# Patient Record
Sex: FEMALE | Race: BLACK OR AFRICAN AMERICAN | NOT HISPANIC OR LATINO | Employment: FULL TIME | URBAN - METROPOLITAN AREA
[De-identification: names, ages, dates, MRNs, and addresses within clinical notes are randomized per-mention and may not be internally consistent; named-entity substitution may affect disease eponyms.]

---

## 2023-03-21 LAB
CLUE CELLS: NORMAL
NUGENT SCORE: 1
YEAST: NORMAL

## 2023-05-12 LAB
ALANINE AMINOTRANSFERASE (SGPT) (U/L) IN SER/PLAS: 13 U/L (ref 7–45)
ALBUMIN (G/DL) IN SER/PLAS: 3.5 G/DL (ref 3.4–5)
ALKALINE PHOSPHATASE (U/L) IN SER/PLAS: 73 U/L (ref 33–110)
ANION GAP IN SER/PLAS: 10 MMOL/L (ref 10–20)
ASPARTATE AMINOTRANSFERASE (SGOT) (U/L) IN SER/PLAS: 12 U/L (ref 9–39)
BILIRUBIN TOTAL (MG/DL) IN SER/PLAS: 0.4 MG/DL (ref 0–1.2)
CALCIUM (MG/DL) IN SER/PLAS: 9.2 MG/DL (ref 8.6–10.6)
CARBON DIOXIDE, TOTAL (MMOL/L) IN SER/PLAS: 26 MMOL/L (ref 21–32)
CHLORIDE (MMOL/L) IN SER/PLAS: 106 MMOL/L (ref 98–107)
CREATININE (MG/DL) IN SER/PLAS: 0.68 MG/DL (ref 0.5–1.05)
ERYTHROCYTE DISTRIBUTION WIDTH (RATIO) BY AUTOMATED COUNT: 13.2 % (ref 11.5–14.5)
ERYTHROCYTE MEAN CORPUSCULAR HEMOGLOBIN CONCENTRATION (G/DL) BY AUTOMATED: 31.7 G/DL (ref 32–36)
ERYTHROCYTE MEAN CORPUSCULAR VOLUME (FL) BY AUTOMATED COUNT: 97 FL (ref 80–100)
ERYTHROCYTES (10*6/UL) IN BLOOD BY AUTOMATED COUNT: 3.9 X10E12/L (ref 4–5.2)
GFR FEMALE: >90 ML/MIN/1.73M2
GLUCOSE (MG/DL) IN SER/PLAS: 71 MG/DL (ref 74–99)
GLUCOSE, 1 HR SCREEN, PREG: 75 MG/DL
HEMATOCRIT (%) IN BLOOD BY AUTOMATED COUNT: 37.9 % (ref 36–46)
HEMOGLOBIN (G/DL) IN BLOOD: 12 G/DL (ref 12–16)
LEUKOCYTES (10*3/UL) IN BLOOD BY AUTOMATED COUNT: 8.7 X10E9/L (ref 4.4–11.3)
NRBC (PER 100 WBCS) BY AUTOMATED COUNT: 0 /100 WBC (ref 0–0)
PLATELETS (10*3/UL) IN BLOOD AUTOMATED COUNT: 209 X10E9/L (ref 150–450)
POTASSIUM (MMOL/L) IN SER/PLAS: 4.1 MMOL/L (ref 3.5–5.3)
PROTEIN TOTAL: 6.8 G/DL (ref 6.4–8.2)
REFLEX ADDED, ANEMIA PANEL: ABNORMAL
SODIUM (MMOL/L) IN SER/PLAS: 138 MMOL/L (ref 136–145)
SYPHILIS TOTAL AB: NONREACTIVE
UREA NITROGEN (MG/DL) IN SER/PLAS: 10 MG/DL (ref 6–23)

## 2023-05-15 ENCOUNTER — APPOINTMENT (OUTPATIENT)
Dept: LAB | Facility: LAB | Age: 23
End: 2023-05-15

## 2023-07-10 LAB — GROUP B STREP SCREEN: NORMAL

## 2024-02-01 ENCOUNTER — LAB REQUISITION (OUTPATIENT)
Dept: LAB | Facility: HOSPITAL | Age: 24
End: 2024-02-01

## 2024-02-01 LAB — SARS-COV-2 RNA RESP QL NAA+PROBE: DETECTED

## 2024-02-01 PROCEDURE — 87635 SARS-COV-2 COVID-19 AMP PRB: CPT

## 2025-05-23 ENCOUNTER — OFFICE VISIT (OUTPATIENT)
Dept: OBSTETRICS AND GYNECOLOGY | Facility: CLINIC | Age: 25
End: 2025-05-23
Payer: COMMERCIAL

## 2025-05-23 VITALS
WEIGHT: 293 LBS | BODY MASS INDEX: 60.33 KG/M2 | DIASTOLIC BLOOD PRESSURE: 85 MMHG | HEART RATE: 62 BPM | SYSTOLIC BLOOD PRESSURE: 124 MMHG

## 2025-05-23 DIAGNOSIS — Z30.46 NEXPLANON REMOVAL: ICD-10-CM

## 2025-05-23 DIAGNOSIS — Z11.3 ROUTINE SCREENING FOR STI (SEXUALLY TRANSMITTED INFECTION): ICD-10-CM

## 2025-05-23 DIAGNOSIS — N89.8 VAGINAL DISCHARGE: ICD-10-CM

## 2025-05-23 DIAGNOSIS — Z01.419 WELL WOMAN EXAM: Primary | ICD-10-CM

## 2025-05-23 PROCEDURE — 99395 PREV VISIT EST AGE 18-39: CPT | Performed by: NURSE PRACTITIONER

## 2025-05-23 PROCEDURE — 99395 PREV VISIT EST AGE 18-39: CPT | Mod: 25 | Performed by: NURSE PRACTITIONER

## 2025-05-23 PROCEDURE — 11982 REMOVE DRUG IMPLANT DEVICE: CPT | Performed by: NURSE PRACTITIONER

## 2025-05-23 PROCEDURE — 1036F TOBACCO NON-USER: CPT | Performed by: NURSE PRACTITIONER

## 2025-05-23 RX ORDER — ACETAZOLAMIDE 500 MG/1
500 CAPSULE, EXTENDED RELEASE ORAL 2 TIMES DAILY
COMMUNITY

## 2025-05-23 ASSESSMENT — ENCOUNTER SYMPTOMS
MUSCULOSKELETAL NEGATIVE: 0
RESPIRATORY NEGATIVE: 0
HEMATOLOGIC/LYMPHATIC NEGATIVE: 0
CONSTITUTIONAL NEGATIVE: 0
NEUROLOGICAL NEGATIVE: 0
ENDOCRINE NEGATIVE: 0
PSYCHIATRIC NEGATIVE: 0
EYES NEGATIVE: 0
CARDIOVASCULAR NEGATIVE: 0
ALLERGIC/IMMUNOLOGIC NEGATIVE: 0
GASTROINTESTINAL NEGATIVE: 0

## 2025-05-23 ASSESSMENT — PATIENT HEALTH QUESTIONNAIRE - PHQ9
SUM OF ALL RESPONSES TO PHQ9 QUESTIONS 1 AND 2: 0
1. LITTLE INTEREST OR PLEASURE IN DOING THINGS: NOT AT ALL
2. FEELING DOWN, DEPRESSED OR HOPELESS: NOT AT ALL

## 2025-05-23 ASSESSMENT — PAIN SCALES - GENERAL: PAINLEVEL_OUTOF10: 0-NO PAIN

## 2025-05-23 NOTE — PROGRESS NOTES
Subjective   Butch Travis is a 25 y.o. female who is here for Annual Exam (Patient here for her annual exam and removal of Nexplanon/No pain/No falls /Patient doesn't want any birth control at the moment/STD blood and urine/LMP - Patient unsure , patient reports she had some bleeding between March and April /Patient reports having some vaginal irritation for 2-3 weeks. Patient has discharge with a smell to it . Patient reports she doesn't have any pain).     Concerns today:  Vaginal discharge with foul odor, irritation, present for a few weeks, denies itching  Desires nexplanon removal    Periods are irregular in setting of Nexplanon, sometimes regular and some times every few months, lasting 7 days.   Dysmenorrhea:none.   Cyclic symptoms include none.      Sexual Activity: sexually active, male partners; Patient reports 1 partners in the last 12 months.  Pain with intercourse? No   Loss of desire? No     History of prior STI: remote history  Desires STI screening? Yes    Current contraception: Nexplanon, desires removal today    Last pap: 3/2023 NILM  History of abnormal Pap smear: no  Family history of uterine or ovarian cancer: no    Last mammogram: N/A  Family history of breast cancer: no    OB History    Para Term  AB Living   5 2 2 0 3 2   SAB IAB Ectopic Multiple Live Births   2 1 0 0 2      Objective   /85   Pulse 62   Wt (!) 159 kg (351 lb 8 oz)   LMP  (LMP Unknown)    Physical Exam  Constitutional:       Appearance: Normal appearance. She is obese.   Genitourinary:      Vulva normal.      Right Labia: No rash, tenderness, lesions or skin changes.     Left Labia: No tenderness, lesions, skin changes or rash.     Vaginal discharge and erythema present.      No vaginal tenderness or bleeding.   Cardiovascular:      Rate and Rhythm: Normal rate and regular rhythm.   Pulmonary:      Effort: Pulmonary effort is normal.      Breath sounds: Normal breath sounds.   Abdominal:       Palpations: Abdomen is soft.      Tenderness: There is no abdominal tenderness.   Neurological:      General: No focal deficit present.      Mental Status: She is alert and oriented to person, place, and time. Mental status is at baseline.   Skin:     General: Skin is warm and dry.   Psychiatric:         Mood and Affect: Mood normal.         Behavior: Behavior normal.         Thought Content: Thought content normal.         Judgment: Judgment normal.        Assessment/Plan   Diagnoses and all orders for this visit:  Well woman exam  Vaginal discharge  -     Vaginitis Gram Stain For Bacterial Vaginosis + Yeast  Nexplanon removal  Routine screening for STI (sexually transmitted infection)  -     C. trachomatis / N. gonorrhoeae, Amplified, Urogenital  -     Hepatitis B Surface Antigen; Future  -     Hepatitis C Antibody; Future  -     HIV 1/2 Antigen/Antibody Screen with Reflex to Confirmation; Future  -     Syphilis Screen with Reflex; Future  -     Trichomonas vaginalis, Amplified    Follow up in about 1 year (around 5/23/2026) for annual exam.  Lucy Garcia, APRN-CNM, APRN-CNP

## 2025-05-23 NOTE — PROGRESS NOTES
Procedure:  Subdermal Contraceptive Implant Removal  Date of Insertion:  7/2023  Date of Removal:  05/23/25    Information related to removal of the implant:   Reason(s) for removal: Irregular bleeding  Was implant palpable before removal? Yes    Procedure:    Implant identified. Left upper arm prepped with Betadinex3. 1% lidocaine injected at planned incision site. A vertical incision 2 mm was performed with a scalpel at the distal end of implant. The implant was removed using a hemostat. The implant was inspected and found to be intact and complete.  Steri strips and a pressure dressing were applied to the site. After removal instructions were given and verbally reviewed with the patient who acknowledged her understanding.    Difficulties with the implant removal procedure?  no    Plans for contraception:  no method    Other follow-up needed:  none  Lucy Garcia, [place], APRMELINDA-CNP

## 2025-05-24 LAB
BV SCORE VAG QL: NORMAL
C TRACH RRNA SPEC QL NAA+PROBE: NOT DETECTED
HBV SURFACE AG SERPL QL IA: NORMAL
HCV AB SERPL QL IA: NORMAL
HIV 1+2 AB+HIV1 P24 AG SERPL QL IA: NORMAL
N GONORRHOEA RRNA SPEC QL NAA+PROBE: NOT DETECTED
QUEST GC CT AMPLIFIED (ALWAYS MESSAGE): NORMAL
T PALLIDUM AB SER QL IA: NORMAL
T VAGINALIS RRNA SPEC QL NAA+PROBE: NOT DETECTED

## 2025-05-28 LAB
HBV SURFACE AG SERPL QL IA: NORMAL
HCV AB SERPL QL IA: NORMAL
HIV 1+2 AB+HIV1 P24 AG SERPL QL IA: NORMAL
T PALLIDUM AB SER QL IA: NEGATIVE

## 2025-07-14 ENCOUNTER — TELEPHONE (OUTPATIENT)
Dept: PRIMARY CARE | Facility: CLINIC | Age: 25
End: 2025-07-14
Payer: COMMERCIAL

## 2025-07-14 NOTE — PROGRESS NOTES
Subjective   Butch Travis is a 25 y.o. female who presents for new patient visit to establish PCP care for annual physical exam.    HPI:     25 y.o. female who presents for new patient visit to establish PCP care for annual physical exam.         EMR/EPIC records reviewed    PMHx:  Idiopathic intracranial hypertension.   Morbid obesity     Healthcare Providers:  [facility]: Dana Velez MD   GYN: Lucy Garcia V, APRN-CNM, APRN-CNP         Preventive Health Services:  -Last physical: 7/16/2025  -last pap smear:  3/2023 NILM   -last mammogram: Never; patient denies family history of breast cancer.  Mammogram due at age 40 years old  -last colonoscopy/cologuard: Never; patient denies family history of colon polyps or colon cancer.  Colon cancer screening due at age 45 years old  -last STI screening: Negative HIV, syphilis, gonorrhea, chlamydia, and trichomonas 5/2025  -Hep C screening: negative 5/2025       Immunizations:  -Childhood vaccines: completed per patient  -updated COVID spike vaccine: NOW DUE  -TDAP: 2023        There is no immunization history on file for this patient.               Today Butch reports:     - doing and feeling well    -vaginal irritation and thinks that she has a yeast infection.  She reports that she washes inside vagina with a rag that may be causing irritation. She denies/chills, vaginal discharge or bleeding, dysuria, hematuria, or UTI sxs.       -taking all medications as prescribed with no reported adverse medication side effects    - Follows with [facility] neurology for idiopathic intracranial hypertension and has undergone lumbar punctures in the past for treatment    - LMP: 7/8/25    - Contraception: None; Nexplanon removed by GYN 5/23/2025    - Sexually active with 1 male partner.  She denies history of STIs          Today  she has no other reported complaints, issues, or problems.  ROS is NEG for HEADACHE, NAUSEA, VOMITING, DIARRHEA, CHEST PAIN, SOB, and BLEEDING.    "Review of systems (12) is negative for all systems except for any identified issues in HPI above.      Objective     /90   Pulse 78   Temp 36.2 °C (97.2 °F)   Ht 1.626 m (5' 4\")   Wt (!) 154 kg (340 lb 8 oz)   LMP 07/08/2025   SpO2 98%   BMI 58.45 kg/m²      COMPLETE PHYSICAL EXAM    GENERAL           General Appearance: pleasant, well-appearing, well-developed, well-hydrated, well-nourished, well-groomed, NAD .        HEENT           NECK supple, Neg for adneopathy no thyroid enlargement or nodules, Oropharynx normal no exudates. Ears: TMs intact, normal, no effusions.       EYES           Pupils: PERRLA, no photophobia.        HEART           Rate and Rhythm regular rate and rhythm. Heart sounds: normal S1S2. Murmurs: none.        LUNGS           Effort: Normal chest wall, no pectus, Normal air entry all fields, Clear to IPPA, RR<16 with no use of accessory muscles.        BREASTS           Bilaterally: no masses, no nipple retraction, no nipple discharge, no abnormal skin changes. Axilla: no lymphadenopathy.        BACK           General: unremarkable, no spinal tenderness or rashes.        ABDOMEN           General: soft, obese , nondistended nontender to palpation,  normal to inspection, no HSM, neg for LKKS or masses and BSs heard in all quadrants                 LYMPHATICS           Cervical: none. Axillary: none.        MUSCULOSKELETAL           gross abnormalities no gross abnormalities, no joint redness or swelling.        EXTREMITIES           Varicose veins: not present. Pulses: 2+ bilateral. Clubbing: none. Cyanosis: no.        NEUROLOGICAL           Orientation: alert and oriented x 3. Grossly normal: yes. Plantars: downgoing bilaterally. Muscle Bulk: normal . Cranial Nerves: CN's II-XII grossly intact.        PSYCHOLOGY           Affect: appropriate. Mood: pleasant.        SKIN: No rashes.  No lesions.    Assessment/Plan   Problem List Items Addressed This Visit    None  Visit Diagnoses  "        Annual physical exam    -  Primary      Morbid obesity (Multi)          Idiopathic intracranial hypertension          Lipid screening          Vitamin D deficiency          Routine screening for STI (sexually transmitted infection)          Cervical cancer screening          Immunization counseling          Immunity status testing          Tuberculosis screening              Annual Physical Exam: Completed today  -labs ordered (see A/P above)    Paperwork for school:  -completed, scanned to chart and given to patient    Immunity testing  - MMR, varicella and Hep B IgG ordered    TB screening:  -Tspot ordered    Elevated diastolic BP: mildly elevated diastolic BP  -low salt, carbohydrate, low cholesterol diet, regularly exercise, and limit alcohol intake    Idiopathic intracranial hypertension HTN: Asymptomatic.  No red flag signs or symptoms.  Followed by [facility] neurology  -cont  medications and management by neurology  -Emergency department and 911/EMS precautions discussed and reviewed with patient    Morbid obesity/class III  -Lipid panel and hemoglobin A1c ordered  -low carbohydrate, low cholesterol diet, regularly exercise, and limit alcohol intake  -Patient encouraged to lose weight  - Referral to nutrition ordered for assistance with weight loss  - Referral to endocrinology weight management ordered to discuss medication options for weight loss  - Patient declined referral to bariatric surgery discussed surgical weight loss management options    Vaginal irritation, and possible yeast vaginitis: most likely due to washing inside vagina with washcloth. Negative STI screening 5/2025. No signs/sxs of UTI  -UA ordered  -fluconazole 150 mg PO x 1  -genital hygiene discussed and reviewed wit patient       Lipid Screening  -lipid panel ordered     Diabetes Screening  -HgBA1c ordered     Vitamin D deficiency  -Vit D levels ordered        STI Screening: Negative HIV, syphilis, GC/CT/trich 5/2025    Breast cancer  screening: Due at age 40 years old      Cervical cancer screening: Up-to-date  - Continue annual well woman visits and Pap smears with GYN     Counseling:      Medication education:        Education:  The patient is counseled regarding potential side-effects of all new medications        Understanding:  Patient expressed understanding        Adherence:  No barriers to adherence identified        Immunizations Counseling  -recommend updated COVID spike vaccine  that can be obtained at local pharmacy     FOLLOW-UP: 4 weeks to discuss and review test results    I have personally reviewed all available pertinent labs, imaging, and consult notes with the patient.     Discussed recommended plan of care with patient. Patient expressed understanding and agreement with plan of care. All of patient's  questions were answered at the time. Patient had no additional questions at the time.         Cherelle Wallis MD, PhD

## 2025-07-14 NOTE — TELEPHONE ENCOUNTER
Patient scheduled for physical 7/16/2025.  Please ask patient if she needs paperwork completion for school since it was unclear.  Does she need immunity testing for measles mumps rubella or varicella as requirement for school (if she is in nursing school or health professions school).  I generally complete paperwork at a follow-up visit once her labs have resulted if she needs any paperwork completed.  please advise. Thank you

## 2025-07-16 ENCOUNTER — OFFICE VISIT (OUTPATIENT)
Dept: PRIMARY CARE | Facility: CLINIC | Age: 25
End: 2025-07-16
Payer: COMMERCIAL

## 2025-07-16 VITALS
HEART RATE: 78 BPM | TEMPERATURE: 97.2 F | SYSTOLIC BLOOD PRESSURE: 122 MMHG | OXYGEN SATURATION: 98 % | DIASTOLIC BLOOD PRESSURE: 90 MMHG | BODY MASS INDEX: 50.02 KG/M2 | HEIGHT: 64 IN | WEIGHT: 293 LBS

## 2025-07-16 DIAGNOSIS — Z11.1 TUBERCULOSIS SCREENING: ICD-10-CM

## 2025-07-16 DIAGNOSIS — E55.9 VITAMIN D DEFICIENCY: ICD-10-CM

## 2025-07-16 DIAGNOSIS — E66.01 MORBID OBESITY (MULTI): ICD-10-CM

## 2025-07-16 DIAGNOSIS — B37.31 YEAST VAGINITIS: ICD-10-CM

## 2025-07-16 DIAGNOSIS — Z13.220 LIPID SCREENING: ICD-10-CM

## 2025-07-16 DIAGNOSIS — G93.2 IDIOPATHIC INTRACRANIAL HYPERTENSION: ICD-10-CM

## 2025-07-16 DIAGNOSIS — Z01.84 IMMUNITY STATUS TESTING: ICD-10-CM

## 2025-07-16 DIAGNOSIS — Z71.85 IMMUNIZATION COUNSELING: ICD-10-CM

## 2025-07-16 DIAGNOSIS — Z23 ENCOUNTER FOR ADMINISTRATION OF VACCINE: ICD-10-CM

## 2025-07-16 DIAGNOSIS — Z11.3 ROUTINE SCREENING FOR STI (SEXUALLY TRANSMITTED INFECTION): ICD-10-CM

## 2025-07-16 DIAGNOSIS — Z00.00 ANNUAL PHYSICAL EXAM: Primary | ICD-10-CM

## 2025-07-16 DIAGNOSIS — Z12.4 CERVICAL CANCER SCREENING: ICD-10-CM

## 2025-07-16 PROCEDURE — 99385 PREV VISIT NEW AGE 18-39: CPT | Performed by: FAMILY MEDICINE

## 2025-07-16 PROCEDURE — 1036F TOBACCO NON-USER: CPT | Performed by: FAMILY MEDICINE

## 2025-07-16 PROCEDURE — 3008F BODY MASS INDEX DOCD: CPT | Performed by: FAMILY MEDICINE

## 2025-07-16 RX ORDER — FLUCONAZOLE 150 MG/1
150 TABLET ORAL ONCE
Qty: 1 TABLET | Refills: 0 | Status: SHIPPED | OUTPATIENT
Start: 2025-07-16 | End: 2025-07-16

## 2025-07-16 ASSESSMENT — COLUMBIA-SUICIDE SEVERITY RATING SCALE - C-SSRS
6. HAVE YOU EVER DONE ANYTHING, STARTED TO DO ANYTHING, OR PREPARED TO DO ANYTHING TO END YOUR LIFE?: NO
2. HAVE YOU ACTUALLY HAD ANY THOUGHTS OF KILLING YOURSELF?: NO
1. IN THE PAST MONTH, HAVE YOU WISHED YOU WERE DEAD OR WISHED YOU COULD GO TO SLEEP AND NOT WAKE UP?: NO

## 2025-07-16 ASSESSMENT — PAIN SCALES - GENERAL: PAINLEVEL_OUTOF10: 0-NO PAIN

## 2025-07-16 ASSESSMENT — PATIENT HEALTH QUESTIONNAIRE - PHQ9
SUM OF ALL RESPONSES TO PHQ9 QUESTIONS 1 AND 2: 0
2. FEELING DOWN, DEPRESSED OR HOPELESS: NOT AT ALL
1. LITTLE INTEREST OR PLEASURE IN DOING THINGS: NOT AT ALL

## 2025-07-18 ENCOUNTER — RESULTS FOLLOW-UP (OUTPATIENT)
Dept: PRIMARY CARE | Facility: CLINIC | Age: 25
End: 2025-07-18
Payer: COMMERCIAL

## 2025-07-18 DIAGNOSIS — E55.9 VITAMIN D DEFICIENCY: Primary | ICD-10-CM

## 2025-07-20 LAB
25(OH)D3+25(OH)D2 SERPL-MCNC: 14 NG/ML (ref 30–100)
ALBUMIN SERPL-MCNC: 4.4 G/DL (ref 3.6–5.1)
ALP SERPL-CCNC: 61 U/L (ref 31–125)
ALT SERPL-CCNC: 14 U/L (ref 6–29)
ANION GAP SERPL CALCULATED.4IONS-SCNC: 7 MMOL/L (CALC) (ref 7–17)
APPEARANCE UR: ABNORMAL
AST SERPL-CCNC: 14 U/L (ref 10–30)
BACTERIA #/AREA URNS HPF: ABNORMAL /HPF
BASOPHILS # BLD AUTO: 60 CELLS/UL (ref 0–200)
BASOPHILS NFR BLD AUTO: 0.9 %
BILIRUB SERPL-MCNC: 0.7 MG/DL (ref 0.2–1.2)
BILIRUB UR QL STRIP: NEGATIVE
BUN SERPL-MCNC: 11 MG/DL (ref 7–25)
CALCIUM SERPL-MCNC: 9.3 MG/DL (ref 8.6–10.2)
CHLORIDE SERPL-SCNC: 107 MMOL/L (ref 98–110)
CHOLEST SERPL-MCNC: 133 MG/DL
CHOLEST/HDLC SERPL: 2.6 (CALC)
CO2 SERPL-SCNC: 26 MMOL/L (ref 20–32)
COLOR UR: ABNORMAL
CREAT SERPL-MCNC: 0.91 MG/DL (ref 0.5–0.96)
EGFRCR SERPLBLD CKD-EPI 2021: 90 ML/MIN/1.73M2
EOSINOPHIL # BLD AUTO: 101 CELLS/UL (ref 15–500)
EOSINOPHIL NFR BLD AUTO: 1.5 %
ERYTHROCYTE [DISTWIDTH] IN BLOOD BY AUTOMATED COUNT: 13.3 % (ref 11–15)
EST. AVERAGE GLUCOSE BLD GHB EST-MCNC: 100 MG/DL
EST. AVERAGE GLUCOSE BLD GHB EST-SCNC: 5.5 MMOL/L
GLUCOSE SERPL-MCNC: 92 MG/DL (ref 65–99)
GLUCOSE UR QL STRIP: NEGATIVE
HBA1C MFR BLD: 5.1 %
HBV SURFACE AB SERPL IA-ACNC: <5 MIU/ML
HCT VFR BLD AUTO: 42.8 % (ref 35–45)
HDLC SERPL-MCNC: 51 MG/DL
HGB BLD-MCNC: 13.4 G/DL (ref 11.7–15.5)
HGB UR QL STRIP: NEGATIVE
HYALINE CASTS #/AREA URNS LPF: ABNORMAL /LPF
IGNF NEG CNTRL BLD: NORMAL
KETONES UR QL STRIP: ABNORMAL
LDLC SERPL CALC-MCNC: 68 MG/DL (CALC)
LEUKOCYTE ESTERASE UR QL STRIP: ABNORMAL
LYMPHOCYTES # BLD AUTO: 2754 CELLS/UL (ref 850–3900)
LYMPHOCYTES NFR BLD AUTO: 41.1 %
M TB IFN-G BLD-IMP: NEGATIVE
MCH RBC QN AUTO: 29.4 PG (ref 27–33)
MCHC RBC AUTO-ENTMCNC: 31.3 G/DL (ref 32–36)
MCV RBC AUTO: 93.9 FL (ref 80–100)
MEV IGG SER IA-ACNC: 229 AU/ML
MITOGEN IGNF.SPOT COUNT BLD: NORMAL
MONOCYTES # BLD AUTO: 670 CELLS/UL (ref 200–950)
MONOCYTES NFR BLD AUTO: 10 %
MUV IGG SER IA-ACNC: 153 AU/ML
NEUTROPHILS # BLD AUTO: 3116 CELLS/UL (ref 1500–7800)
NEUTROPHILS NFR BLD AUTO: 46.5 %
NITRITE UR QL STRIP: NEGATIVE
NONHDLC SERPL-MCNC: 82 MG/DL (CALC)
PH UR STRIP: 5.5 [PH] (ref 5–8)
PLATELET # BLD AUTO: 248 THOUSAND/UL (ref 140–400)
PMV BLD REES-ECKER: 11.3 FL (ref 7.5–12.5)
POTASSIUM SERPL-SCNC: 4 MMOL/L (ref 3.5–5.3)
PROT SERPL-MCNC: 8 G/DL (ref 6.1–8.1)
PROT UR QL STRIP: ABNORMAL
QUEST PANEL A SPOT COUNT: 0
QUEST PANEL B SPOT COUNT: 0
RBC # BLD AUTO: 4.56 MILLION/UL (ref 3.8–5.1)
RBC #/AREA URNS HPF: ABNORMAL /HPF
RUBV IGG SERPL IA-ACNC: 7.19 INDEX
SERVICE CMNT-IMP: ABNORMAL
SODIUM SERPL-SCNC: 140 MMOL/L (ref 135–146)
SP GR UR STRIP: 1.03 (ref 1–1.03)
SQUAMOUS #/AREA URNS HPF: ABNORMAL /HPF
TRIGL SERPL-MCNC: 62 MG/DL
TSH SERPL-ACNC: 3.1 MIU/L
VZV IGG SER IA-ACNC: 1.7 S/CO
WBC # BLD AUTO: 6.7 THOUSAND/UL (ref 3.8–10.8)
WBC #/AREA URNS HPF: ABNORMAL /HPF

## 2025-07-21 RX ORDER — ERGOCALCIFEROL 1.25 MG/1
1.25 CAPSULE ORAL WEEKLY
Qty: 4 CAPSULE | Refills: 2 | Status: SHIPPED | OUTPATIENT
Start: 2025-07-21 | End: 2025-10-13

## 2025-07-21 NOTE — PROGRESS NOTES
Subjective   Butch Travis is a 25 y.o. female who presents for follow-up visit to discuss and review test results and for hepatitis B #1/3 vaccine for lack of immunity to hepatitis B.    HPI:      25 y.o. female who presents for follow-up visit to discuss and review test results and for hepatitis B #1/3 vaccine for lack of immunity to hepatitis B.       EMR/EPIC records reviewed    Last seen by me on 7/16/2025 for new patient visit to establish PCP care for annual physical exam.  At visit:   Annual Physical Exam: Completed today  -labs ordered (see A/P above)     Paperwork for school:  -completed, scanned to chart and given to patient     Immunity testing  - MMR, varicella and Hep B IgG ordered     TB screening:  -Tspot ordered     Elevated diastolic BP: mildly elevated diastolic BP  -low salt, carbohydrate, low cholesterol diet, regularly exercise, and limit alcohol intake     Idiopathic intracranial hypertension HTN: Asymptomatic.  No red flag signs or symptoms.  Followed by [facility]  -cont  medications and management by neurology  -Emergency department and 911/EMS precautions discussed and reviewed with patient     Morbid obesity/class III  -Lipid panel and hemoglobin A1c ordered  -low carbohydrate, low cholesterol diet, regularly exercise, and limit alcohol intake  -Patient encouraged to lose weight  - Referral to nutrition ordered for assistance with weight loss  - Referral to endocrinology weight management ordered to discuss medication options for weight loss  - Patient declined referral to bariatric surgery discussed surgical weight loss management options     Vaginal irritation, and possible yeast vaginitis: most likely due to washing inside vagina with washcloth. Negative STI screening 5/2025. No signs/sxs of UTI  -UA ordered  -fluconazole 150 mg PO x 1  -genital hygiene discussed and reviewed wit patient         Lipid Screening  -lipid panel ordered     Diabetes Screening  -HgBA1c ordered      Vitamin D deficiency  -Vit D levels ordered        STI Screening: Negative HIV, syphilis, GC/CT/trich 5/2025     Breast cancer screening: Due at age 40 years old        Cervical cancer screening: Up-to-date  - Continue annual well woman visits and Pap smears with GYN         Immunizations Counseling  -recommend updated COVID spike vaccine  that can be obtained at local pharmacy     FOLLOW-UP: 4 weeks to discuss and review test results          PMHx:  Idiopathic intracranial hypertension.   Morbid obesity  Lack of immunity to hepatitis B     Healthcare Providers:  [facility]: Dana Velez MD   GYN: Lucy Garcia V, APRN-CNM, APRN-CNP         Preventive Health Services:  -Last physical: 7/16/2025  -last pap smear:  3/2023 NILM   -last mammogram: Never; patient denies family history of breast cancer.  Mammogram due at age 40 years old  -last colonoscopy/cologuard: Never; patient denies family history of colon polyps or colon cancer.  Colon cancer screening due at age 45 years old  -last STI screening: Negative HIV, syphilis, gonorrhea, chlamydia, and trichomonas 5/2025  -Hep C screening: negative 5/2025        Immunizations:  -Childhood vaccines: completed per patient  -updated COVID spike vaccine: NOW DUE  -TDAP: 2023    Immunization History   Administered Date(s) Administered    DTaP / Hib 2000, 2000    DTaP vaccine, pediatric  (INFANRIX) 2000, 2000, 2000, 11/19/2001, 07/15/2004    Flu vaccine (IIV4), preservative free *Check age/dose* 11/06/2023    HPV, Quadrivalent 06/06/2013    Hepatitis B vaccine, 19 yrs and under (RECOMBIVAX, ENGERIX) 2000, 2000, 2000    HiB PRP-OMP conjugate vaccine, pediatric (PEDVAXHIB) 2000, 2000, 2000, 11/19/2001    MMR vaccine, subcutaneous (MMR II) 05/24/2001, 07/15/2004, 07/26/2023    Meningococcal ACWY-D (Menactra) 4-valent conjugate vaccine 06/06/2013, 09/06/2016    Pneumococcal conjugate vaccine, 13-valent  (PREVNAR 13) 2000, 2000, 02/08/2001    Poliovirus vaccine, subcutaneous (IPOL) 2000, 2000, 2000, 07/15/2004    Tdap vaccine, age 7 year and older (BOOSTRIX, ADACEL) 06/06/2013, 05/12/2023    Varicella vaccine, subcutaneous (VARIVAX) 05/24/2001, 08/21/2008                 Interval history:    - Labs ordered 7/16/2025 completed.  Based on test results:    Vitamin D deficiency: Patient advised to please start vitamin D 50,000 units once weekly for 12 weeks; prescription sent to pharmacy with 2 refills.  After completing vitamin D 50,000 units once weekly for 12 weeks, then start over-the-counter vitamin D3 2000 units daily.      - Urinalysis with trace signs of inflammation but no overt signs of a UTI.  Urine sample appears to be contaminated     -Grossly normal blood counts     - Negative immunity to hepatitis B based on hepatitis B antibody titers: Patient advised that she  will need to receive the hepatitis vaccine series (3 shot series: After receiving the first vaccine hepatitis B, then you will need to receive #2 vaccine in 1 month, and then #3 vaccine in 6 months); please schedule nurse visit to start the vaccine series.       -Normal hemoglobin A1c: No prediabetes     -Good cholesterol control     -Normal thyroid function     -Normal liver and kidney function     -positive Immunity to varicella, measles, rubella, and mumps     -Negative T spot screening for tuberculosis           Today Butch reports:     - doing and feeling well      -taking all medications as prescribed with no reported adverse medication side effects     - Follows with [facility]y for idiopathic intracranial hypertension and has undergone lumbar punctures in the past for treatment     - Has scheduled appointment with endocrinology weight management to discuss weight loss medication options        Today  she has no other reported complaints, issues, or problems.  ROS is NEG for HEADACHE, NAUSEA, VOMITING,  DIARRHEA, CHEST PAIN, SOB, and BLEEDING.   Review of systems (12) is negative for all systems except for any identified issues in HPI above.          Objective     /88   Pulse 82   Temp 36.4 °C (97.6 °F)   LMP 07/08/2025   SpO2 97%      Physical Examination:       GENERAL           General Appearance: well-appearing, well-developed, well-hydrated, well-nourished, no acute distress.        HEENT           NECK supple, no masses or thyromegaly, no carotid bruit.        EYES           Extraocular Movements: normal, bilateral eyes ISABEL, no conjunctival injection.        HEART           Rate and Rhythm regular rate and rhythm. Heart sounds: normal S1S2, no S3 or S4. Murmurs: none.        CHEST           Breath sounds: Clear to IPPA, RR<16 no use of accessory muscles.        ABDOMEN           General: soft, obese, nondistended nontender to palpation, no HSM, neg for LKKS or masses, no scleral icterus or jaundice.        MUSCULOSKELETAL           Joints Demonstration: Neg for erythema, swelling or joint deformities. gross abnormalities no gross abnormalities.        EXTREMITIES           Lower Extremities: Neg for cyanosis, clubbing or edema.       Assessment/Plan   Problem List Items Addressed This Visit       Vitamin D deficiency    Idiopathic intracranial hypertension    Morbid obesity (Multi)     Other Visit Diagnoses         Elevated blood pressure reading    -  Primary      Lack of immunity to hepatitis B virus demonstrated by serologic test        Relevant Orders    Hepatitis B vaccine, 20 yrs and older (RECOMBIVAX, ENGERIX)      Immunization counseling          Encounter for administration of vaccine        Relevant Orders    Hepatitis B vaccine, 20 yrs and older (RECOMBIVAX, ENGERIX)          Lack of immunity to hepatitis B   - Hepatitis vaccine series due: Hep B #1/3 now due lack of immunity to hepatitis B==> received today; hepatitis B vaccine #2/3 doing 1 month and hepatitis B vaccine #3/3 due in 6  months      History of Elevated diastolic BP, WNL today:   -low salt, carbohydrate, low cholesterol diet, regularly exercise, and limit alcohol intake     Idiopathic intracranial hypertension HTN: Asymptomatic.  No red flag signs or symptoms.  Followed by [facility]  -cont  medications and management by neurology  -Emergency department and 911/EMS precautions discussed and reviewed with patient     Morbid obesity/class III  -low carbohydrate, low cholesterol diet, regularly exercise, and limit alcohol intake  -Patient encouraged to lose weight  - Referral to nutrition ordered 7/16/2025 for assistance with weight loss  - Referral to endocrinology weight management ordered 7/16/2025 to discuss medication options for weight loss  - Patient declined referral to bariatric surgery discussed surgical weight loss management options      Vitamin D deficiency  - Continue vitamin D 50,000 units once weekly for 12 weeks.  Patient advised after completing vitamin D 50,000 units once weekly for 12 weeks then start over-the-counter vitamin D3 2000 units daily  -Will plan to recheck vitamin D levels in about 12 weeks after repletion is completed; next due 10/2025        Counseling:      Medication education:        Education:  The patient is counseled regarding potential side-effects of all new medications        Understanding:  Patient expressed understanding        Adherence:  No barriers to adherence identified        Immunizations Counseling  -Hep B #1/3 now due lack of immunity to hepatitis B==> received today; hepatitis B vaccine #2/3 doing 1 month and hepatitis B vaccine #3/3 due in 6 months  -recommend updated COVID spike vaccine  that can be obtained at local pharmacy     FOLLOW-UP: 4 weeks for RN visit for Hep B#2/3 and 12 weeks     I have personally reviewed all available pertinent labs, imaging, and consult notes with the patient.     Discussed recommended plan of care with patient. Patient expressed understanding  and agreement with plan of care. All of patient's  questions were answered at the time. Patient had no additional questions at the time.            Cherelle Wallis MD, PhD

## 2025-07-22 ENCOUNTER — OFFICE VISIT (OUTPATIENT)
Dept: PRIMARY CARE | Facility: CLINIC | Age: 25
End: 2025-07-22
Payer: COMMERCIAL

## 2025-07-22 VITALS
TEMPERATURE: 97.6 F | HEART RATE: 82 BPM | SYSTOLIC BLOOD PRESSURE: 120 MMHG | DIASTOLIC BLOOD PRESSURE: 88 MMHG | OXYGEN SATURATION: 97 %

## 2025-07-22 DIAGNOSIS — Z71.85 IMMUNIZATION COUNSELING: ICD-10-CM

## 2025-07-22 DIAGNOSIS — Z23 ENCOUNTER FOR ADMINISTRATION OF VACCINE: ICD-10-CM

## 2025-07-22 DIAGNOSIS — E66.01 MORBID OBESITY (MULTI): ICD-10-CM

## 2025-07-22 DIAGNOSIS — G93.2 IDIOPATHIC INTRACRANIAL HYPERTENSION: ICD-10-CM

## 2025-07-22 DIAGNOSIS — R03.0 ELEVATED BLOOD PRESSURE READING: Primary | ICD-10-CM

## 2025-07-22 DIAGNOSIS — E55.9 VITAMIN D DEFICIENCY: ICD-10-CM

## 2025-07-22 DIAGNOSIS — Z01.84 LACK OF IMMUNITY TO HEPATITIS B VIRUS DEMONSTRATED BY SEROLOGIC TEST: ICD-10-CM

## 2025-07-22 PROCEDURE — 90739 HEPB VACC 2/4 DOSE ADULT IM: CPT | Performed by: FAMILY MEDICINE

## 2025-07-22 PROCEDURE — 99214 OFFICE O/P EST MOD 30 MIN: CPT | Performed by: FAMILY MEDICINE

## 2025-07-22 PROCEDURE — 99214 OFFICE O/P EST MOD 30 MIN: CPT | Mod: 25 | Performed by: FAMILY MEDICINE

## 2025-07-22 PROCEDURE — 1036F TOBACCO NON-USER: CPT | Performed by: FAMILY MEDICINE

## 2025-07-22 ASSESSMENT — COLUMBIA-SUICIDE SEVERITY RATING SCALE - C-SSRS
1. IN THE PAST MONTH, HAVE YOU WISHED YOU WERE DEAD OR WISHED YOU COULD GO TO SLEEP AND NOT WAKE UP?: NO
6. HAVE YOU EVER DONE ANYTHING, STARTED TO DO ANYTHING, OR PREPARED TO DO ANYTHING TO END YOUR LIFE?: NO
2. HAVE YOU ACTUALLY HAD ANY THOUGHTS OF KILLING YOURSELF?: NO

## 2025-07-22 ASSESSMENT — PAIN SCALES - GENERAL: PAINLEVEL_OUTOF10: 0-NO PAIN

## 2025-07-22 ASSESSMENT — PATIENT HEALTH QUESTIONNAIRE - PHQ9
1. LITTLE INTEREST OR PLEASURE IN DOING THINGS: NOT AT ALL
2. FEELING DOWN, DEPRESSED OR HOPELESS: NOT AT ALL
SUM OF ALL RESPONSES TO PHQ9 QUESTIONS 1 AND 2: 0

## 2025-08-20 ENCOUNTER — APPOINTMENT (OUTPATIENT)
Dept: ENDOCRINOLOGY | Facility: CLINIC | Age: 25
End: 2025-08-20
Payer: COMMERCIAL

## 2025-08-21 DIAGNOSIS — Z23 NEED FOR VACCINATION: ICD-10-CM

## 2025-08-21 DIAGNOSIS — Z23 NEED FOR HEPATITIS B VACCINATION: ICD-10-CM

## 2025-08-22 ENCOUNTER — TELEPHONE (OUTPATIENT)
Dept: PRIMARY CARE | Facility: CLINIC | Age: 25
End: 2025-08-22
Payer: COMMERCIAL

## 2025-08-25 ENCOUNTER — APPOINTMENT (OUTPATIENT)
Dept: PRIMARY CARE | Facility: CLINIC | Age: 25
End: 2025-08-25
Payer: COMMERCIAL

## 2025-08-27 ENCOUNTER — APPOINTMENT (OUTPATIENT)
Dept: PRIMARY CARE | Facility: CLINIC | Age: 25
End: 2025-08-27
Payer: COMMERCIAL

## 2025-08-27 ENCOUNTER — CLINICAL SUPPORT (OUTPATIENT)
Dept: PRIMARY CARE | Facility: CLINIC | Age: 25
End: 2025-08-27
Payer: COMMERCIAL

## 2025-08-27 DIAGNOSIS — Z23 NEED FOR VACCINATION: ICD-10-CM

## 2025-08-27 PROCEDURE — 90739 HEPB VACC 2/4 DOSE ADULT IM: CPT | Performed by: FAMILY MEDICINE

## 2025-09-23 ENCOUNTER — APPOINTMENT (OUTPATIENT)
Dept: PRIMARY CARE | Facility: CLINIC | Age: 25
End: 2025-09-23
Payer: COMMERCIAL